# Patient Record
Sex: MALE | Race: WHITE
[De-identification: names, ages, dates, MRNs, and addresses within clinical notes are randomized per-mention and may not be internally consistent; named-entity substitution may affect disease eponyms.]

---

## 2018-07-19 ENCOUNTER — HOSPITAL ENCOUNTER (EMERGENCY)
Dept: HOSPITAL 19 - COL.ER | Age: 63
Discharge: HOME | End: 2018-07-19
Payer: SELF-PAY

## 2018-07-19 VITALS — WEIGHT: 213.41 LBS | HEIGHT: 69.02 IN | BODY MASS INDEX: 31.61 KG/M2

## 2018-07-19 VITALS — DIASTOLIC BLOOD PRESSURE: 73 MMHG | SYSTOLIC BLOOD PRESSURE: 134 MMHG | TEMPERATURE: 98 F | HEART RATE: 84 BPM

## 2018-07-19 DIAGNOSIS — E11.9: ICD-10-CM

## 2018-07-19 DIAGNOSIS — S50.12XA: Primary | ICD-10-CM
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
## 2018-07-19 DIAGNOSIS — Z79.4: ICD-10-CM

## 2018-07-19 DIAGNOSIS — E78.5: ICD-10-CM

## 2018-09-17 ENCOUNTER — HOSPITAL ENCOUNTER (OUTPATIENT)
Dept: HOSPITAL 19 - COL.RAD | Age: 63
End: 2018-09-17
Attending: PHYSICIAN ASSISTANT
Payer: COMMERCIAL

## 2018-09-17 DIAGNOSIS — M19.012: ICD-10-CM

## 2018-09-17 DIAGNOSIS — S46.012A: Primary | ICD-10-CM

## 2018-09-18 ENCOUNTER — HOSPITAL ENCOUNTER (OUTPATIENT)
Dept: HOSPITAL 19 - WSOH | Age: 63
LOS: 71 days | Discharge: HOME | End: 2018-11-28
Attending: INTERNAL MEDICINE
Payer: COMMERCIAL
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
## 2018-09-18 DIAGNOSIS — S50.12XD: Primary | ICD-10-CM

## 2018-09-18 DIAGNOSIS — Z79.84: ICD-10-CM

## 2018-09-18 DIAGNOSIS — Z79.899: ICD-10-CM

## 2018-09-18 DIAGNOSIS — M25.512: ICD-10-CM
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
## 2019-04-17 ENCOUNTER — HOSPITAL ENCOUNTER (EMERGENCY)
Dept: HOSPITAL 19 - COL.ER | Age: 64
Discharge: HOME | End: 2019-04-17
Payer: COMMERCIAL

## 2019-04-17 VITALS — BODY MASS INDEX: 31.48 KG/M2 | WEIGHT: 212.53 LBS | HEIGHT: 69.02 IN

## 2019-04-17 VITALS — HEART RATE: 90 BPM | SYSTOLIC BLOOD PRESSURE: 105 MMHG | DIASTOLIC BLOOD PRESSURE: 83 MMHG

## 2019-04-17 VITALS — TEMPERATURE: 99.4 F

## 2019-04-17 DIAGNOSIS — K56.41: Primary | ICD-10-CM

## 2019-04-17 DIAGNOSIS — Z79.4: ICD-10-CM

## 2019-04-17 DIAGNOSIS — R33.9: ICD-10-CM

## 2019-12-27 ENCOUNTER — HOSPITAL ENCOUNTER (OUTPATIENT)
Dept: HOSPITAL 19 - COL.RAD | Age: 64
End: 2019-12-27
Attending: PHYSICIAN ASSISTANT
Payer: COMMERCIAL

## 2019-12-27 DIAGNOSIS — S46.811A: ICD-10-CM

## 2019-12-27 DIAGNOSIS — Z98.890: ICD-10-CM

## 2019-12-27 DIAGNOSIS — M75.121: Primary | ICD-10-CM

## 2019-12-27 DIAGNOSIS — M19.011: ICD-10-CM

## 2019-12-27 DIAGNOSIS — S46.111A: ICD-10-CM

## 2020-10-06 ENCOUNTER — HOSPITAL ENCOUNTER (OUTPATIENT)
Dept: HOSPITAL 19 - ZCOL.LAB | Age: 65
End: 2020-10-06
Attending: FAMILY MEDICINE
Payer: COMMERCIAL

## 2020-10-06 DIAGNOSIS — Z20.828: Primary | ICD-10-CM

## 2021-03-23 ENCOUNTER — HOSPITAL ENCOUNTER (OUTPATIENT)
Dept: HOSPITAL 19 - COL.RAD | Age: 66
End: 2021-03-23
Attending: ORTHOPAEDIC SURGERY
Payer: COMMERCIAL

## 2021-03-23 DIAGNOSIS — Z98.890: ICD-10-CM

## 2021-03-23 DIAGNOSIS — M75.121: Primary | ICD-10-CM

## 2021-03-23 PROCEDURE — A9585 GADOBUTROL INJECTION: HCPCS

## 2021-08-09 ENCOUNTER — HOSPITAL ENCOUNTER (OUTPATIENT)
Dept: HOSPITAL 19 - WSOH | Age: 66
LOS: 76 days | Discharge: HOME | End: 2021-10-24
Attending: PHYSICIAN ASSISTANT
Payer: COMMERCIAL

## 2021-08-09 DIAGNOSIS — Z90.49: ICD-10-CM
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
## 2021-08-09 DIAGNOSIS — Z98.890: ICD-10-CM

## 2021-08-09 DIAGNOSIS — Z79.4: ICD-10-CM

## 2021-08-09 DIAGNOSIS — S60.222A: Primary | ICD-10-CM

## 2021-08-09 DIAGNOSIS — E78.00: ICD-10-CM

## 2021-08-09 DIAGNOSIS — M51.36: ICD-10-CM

## 2021-08-09 DIAGNOSIS — E11.40: ICD-10-CM

## 2021-08-09 PROCEDURE — A6549 G COMPRESSION STOCKING: HCPCS

## 2021-08-09 PROCEDURE — 24091: CPT

## 2021-12-20 ENCOUNTER — HOSPITAL ENCOUNTER (OUTPATIENT)
Dept: HOSPITAL 19 - WSPT | Age: 66
LOS: 11 days | Discharge: HOME | End: 2021-12-31
Attending: PHYSICIAN ASSISTANT
Payer: COMMERCIAL

## 2021-12-20 DIAGNOSIS — Z98.890: ICD-10-CM

## 2021-12-20 DIAGNOSIS — E78.00: ICD-10-CM
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
## 2021-12-20 DIAGNOSIS — M51.36: ICD-10-CM

## 2021-12-20 DIAGNOSIS — E11.40: ICD-10-CM

## 2021-12-20 DIAGNOSIS — S60.222D: Primary | ICD-10-CM

## 2021-12-20 DIAGNOSIS — Z90.49: ICD-10-CM

## 2022-01-03 ENCOUNTER — HOSPITAL ENCOUNTER (OUTPATIENT)
Dept: HOSPITAL 19 - WSPT | Age: 67
LOS: 28 days | End: 2022-01-31
Attending: PHYSICIAN ASSISTANT
Payer: COMMERCIAL

## 2022-01-03 DIAGNOSIS — S60.222D: Primary | ICD-10-CM

## 2022-01-03 DIAGNOSIS — M51.36: ICD-10-CM

## 2022-02-05 ENCOUNTER — HOSPITAL ENCOUNTER (INPATIENT)
Dept: HOSPITAL 19 - COL.ER | Age: 67
LOS: 3 days | Discharge: HOME | DRG: 439 | End: 2022-02-08
Attending: INTERNAL MEDICINE | Admitting: INTERNAL MEDICINE
Payer: COMMERCIAL

## 2022-02-05 VITALS — HEIGHT: 69.02 IN | WEIGHT: 218.26 LBS | BODY MASS INDEX: 32.33 KG/M2

## 2022-02-05 DIAGNOSIS — E11.9: ICD-10-CM

## 2022-02-05 DIAGNOSIS — Z79.4: ICD-10-CM

## 2022-02-05 DIAGNOSIS — K76.0: ICD-10-CM

## 2022-02-05 DIAGNOSIS — K85.90: Primary | ICD-10-CM

## 2022-02-05 DIAGNOSIS — Z23: ICD-10-CM

## 2022-02-05 DIAGNOSIS — E78.1: ICD-10-CM

## 2022-02-05 DIAGNOSIS — K86.3: ICD-10-CM

## 2022-02-05 DIAGNOSIS — I10: ICD-10-CM

## 2022-02-05 DIAGNOSIS — E78.5: ICD-10-CM

## 2022-02-05 DIAGNOSIS — N40.0: ICD-10-CM

## 2022-02-05 DIAGNOSIS — D72.829: ICD-10-CM

## 2022-02-05 DIAGNOSIS — Z90.49: ICD-10-CM

## 2022-02-05 LAB
ALBUMIN SERPL-MCNC: 4 GM/DL (ref 3.4–4.8)
ALP SERPL-CCNC: 52 U/L (ref 40–150)
ALT SERPL-CCNC: 32 U/L (ref 0–55)
ANION GAP SERPL CALC-SCNC: 12 MMOL/L (ref 7–16)
AST SERPL-CCNC: 24 U/L (ref 5–34)
BASOPHILS # BLD: 0.1 K/MM3 (ref 0–0.2)
BASOPHILS NFR BLD AUTO: 0.7 % (ref 0–2)
BILIRUB SERPL-MCNC: 0.4 MG/DL (ref 0.2–1.2)
BUN SERPL-MCNC: 27 MG/DL (ref 8–26)
CALCIUM SERPL-MCNC: 9.8 MG/DL (ref 8.4–10.2)
CHLORIDE SERPL-SCNC: 103 MMOL/L (ref 98–107)
CO2 SERPL-SCNC: 22 MMOL/L (ref 23–31)
CREAT SERPL-SCNC: 1.11 MG/DL (ref 0.72–1.25)
CRP SERPL-MCNC: 0.22 MG/DL (ref 0–0.5)
EOSINOPHIL # BLD: 0.1 K/MM3 (ref 0–0.7)
EOSINOPHIL NFR BLD: 0.7 % (ref 0–4)
ERYTHROCYTE [DISTWIDTH] IN BLOOD BY AUTOMATED COUNT: 13 % (ref 11.5–14.5)
GLUCOSE SERPL-MCNC: 162 MG/DL (ref 70–99)
GLUCOSE UR QL STRIP.AUTO: (no result)
GRANULOCYTES # BLD AUTO: 74.7 % (ref 42.2–75.2)
HCT VFR BLD AUTO: 43.3 % (ref 42–52)
HGB BLD-MCNC: 14.6 G/DL (ref 13.5–18)
LYMPHOCYTES # BLD: 1.8 K/MM3 (ref 1.2–3.4)
LYMPHOCYTES NFR BLD: 15.2 % (ref 20–51)
MCH RBC QN AUTO: 29 PG (ref 27–31)
MCHC RBC AUTO-ENTMCNC: 34 G/DL (ref 33–37)
MCV RBC AUTO: 86 FL (ref 80–100)
MONOCYTES # BLD: 0.9 K/MM3 (ref 0.1–0.6)
MONOCYTES NFR BLD AUTO: 7.8 % (ref 1.7–9.3)
NEUTROPHILS # BLD: 8.9 K/MM3 (ref 1.4–6.5)
PH UR STRIP.AUTO: 5 [PH] (ref 5–8)
PLATELET # BLD AUTO: 216 K/MM3 (ref 130–400)
PMV BLD AUTO: 11.2 FL (ref 7.4–10.4)
POTASSIUM SERPL-SCNC: 4.1 MMOL/L (ref 3.5–4.5)
PROT SERPL-MCNC: 7.2 GM/DL (ref 6.2–8.1)
RBC # BLD AUTO: 5.06 M/MM3 (ref 4.2–5.6)
SODIUM SERPL-SCNC: 137 MMOL/L (ref 136–145)
SP GR UR STRIP.AUTO: 1.04 (ref 1–1.03)
URN COLLECT METHOD CLASS: (no result)

## 2022-02-06 VITALS — SYSTOLIC BLOOD PRESSURE: 145 MMHG | TEMPERATURE: 97.4 F | HEART RATE: 64 BPM | DIASTOLIC BLOOD PRESSURE: 75 MMHG

## 2022-02-06 VITALS — TEMPERATURE: 97.8 F | SYSTOLIC BLOOD PRESSURE: 140 MMHG | DIASTOLIC BLOOD PRESSURE: 82 MMHG | HEART RATE: 63 BPM

## 2022-02-06 VITALS — SYSTOLIC BLOOD PRESSURE: 142 MMHG | HEART RATE: 57 BPM | DIASTOLIC BLOOD PRESSURE: 72 MMHG | TEMPERATURE: 97.3 F

## 2022-02-06 VITALS — HEART RATE: 69 BPM | TEMPERATURE: 99.5 F | SYSTOLIC BLOOD PRESSURE: 128 MMHG | DIASTOLIC BLOOD PRESSURE: 63 MMHG

## 2022-02-06 VITALS — SYSTOLIC BLOOD PRESSURE: 150 MMHG | HEART RATE: 57 BPM | DIASTOLIC BLOOD PRESSURE: 78 MMHG | TEMPERATURE: 97.5 F

## 2022-02-06 VITALS — SYSTOLIC BLOOD PRESSURE: 146 MMHG | DIASTOLIC BLOOD PRESSURE: 75 MMHG | HEART RATE: 70 BPM | TEMPERATURE: 98.6 F

## 2022-02-06 VITALS — TEMPERATURE: 98.7 F | SYSTOLIC BLOOD PRESSURE: 128 MMHG | DIASTOLIC BLOOD PRESSURE: 66 MMHG | HEART RATE: 77 BPM

## 2022-02-06 LAB
ANION GAP SERPL CALC-SCNC: 8 MMOL/L (ref 7–16)
BASOPHILS # BLD: 0.1 K/MM3 (ref 0–0.2)
BASOPHILS NFR BLD AUTO: 0.5 % (ref 0–2)
BUN SERPL-MCNC: 23 MG/DL (ref 8–26)
CALCIUM SERPL-MCNC: 8.9 MG/DL (ref 8.4–10.2)
CHLORIDE SERPL-SCNC: 105 MMOL/L (ref 98–107)
CHOLEST SPEC-SCNC: 176 MG/DL (ref 0–199)
CHOLEST/HDLC SERPL-SRTO: 5.8
CO2 SERPL-SCNC: 25 MMOL/L (ref 23–31)
CREAT SERPL-SCNC: 1.05 MG/DL (ref 0.72–1.25)
EOSINOPHIL # BLD: 0.1 K/MM3 (ref 0–0.7)
EOSINOPHIL NFR BLD: 0.7 % (ref 0–4)
ERYTHROCYTE [DISTWIDTH] IN BLOOD BY AUTOMATED COUNT: 13 % (ref 11.5–14.5)
GLUCOSE SERPL-MCNC: 119 MG/DL (ref 70–99)
GRANULOCYTES # BLD AUTO: 72 % (ref 42.2–75.2)
HCT VFR BLD AUTO: 40.8 % (ref 42–52)
HDLC SERPL-MCNC: 30 MG/DL (ref 40–60)
HGB BLD-MCNC: 13.6 G/DL (ref 13.5–18)
LDLC SERPL-MCNC: 72 MG/DL
LYMPHOCYTES # BLD: 1.8 K/MM3 (ref 1.2–3.4)
LYMPHOCYTES NFR BLD: 16.1 % (ref 20–51)
MAGNESIUM SERPL-MCNC: 2 MG/DL (ref 1.6–2.6)
MCH RBC QN AUTO: 29 PG (ref 27–31)
MCHC RBC AUTO-ENTMCNC: 33 G/DL (ref 33–37)
MCV RBC AUTO: 86 FL (ref 80–100)
MONOCYTES # BLD: 1.1 K/MM3 (ref 0.1–0.6)
MONOCYTES NFR BLD AUTO: 9.9 % (ref 1.7–9.3)
NEUTROPHILS # BLD: 7.8 K/MM3 (ref 1.4–6.5)
PLATELET # BLD AUTO: 182 K/MM3 (ref 130–400)
PMV BLD AUTO: 11.9 FL (ref 7.4–10.4)
POTASSIUM SERPL-SCNC: 4.3 MMOL/L (ref 3.5–4.5)
RBC # BLD AUTO: 4.72 M/MM3 (ref 4.2–5.6)
SODIUM SERPL-SCNC: 138 MMOL/L (ref 136–145)
TRIGL SERPL-MCNC: 368 MG/DL (ref 0–149)

## 2022-02-06 NOTE — NUR
Lyssa met with the pt who stated his preference to return home once medically
stable. The pt lives at home with his wife. Esthela, [phone]. The pt
reports no issues with ADLS and does use a glucometer. The pt pcp is Zach Pinto and gets his medications from [facility]. The pt reports no
DPOA-HC and is not interested at this time.  No other needs stated,
sw to await further recommendations and follow up as needed.
DC Plan: Home w/wife

## 2022-02-06 NOTE — NUR
Has been resting quietly- Did get Morphine IV x2 this shift for abd pain 7/10.
Iv fluids of NS at 250cc/hr. Voiding.

## 2022-02-06 NOTE — NUR
PT PLEASANT, AOX4, PT REPORTS PAIN 8/10 SAYING "IT FEELS EVEN WORSE THAN WHEN
I CAME IN" , ASSESSMENT PERFORMED, MEDICATIONS GIVEN, PAIN MEDICATION GIVEN
WITH TUMS PER PT REQUEST, PT WANTING TO DRINK LOTS OF FLUIDS AND EAT,
ENCOURAGED SLOWING DOWN ON FLUIDS TO REST BOWELS. PT WANTING SOMETHING FOR
CONSTIPATION, REPORTS LAST BM YESTERDAY MORNING. STATES "I DON'T WANT THIS
STUFF TO BACK ME UP, ITS HAPPENED BEFORE".

## 2022-02-06 NOTE — NUR
Admitted to medical floor from ER,, DX pancreatitis, VSS, pleasant, oriented
x4, Sitting in recliner at this time, does not want a gown, would prefer to
stay in shorts and a t-shirt. Has glasses, bilteral hearing aides, cell phone,
pager at bedside. States abd pain 4/10-was given Morphne as ordered. On C.L
diet. Iv fluids of NS at 250cc/hr

## 2022-02-07 VITALS — DIASTOLIC BLOOD PRESSURE: 58 MMHG | HEART RATE: 66 BPM | SYSTOLIC BLOOD PRESSURE: 116 MMHG | TEMPERATURE: 99.9 F

## 2022-02-07 VITALS — DIASTOLIC BLOOD PRESSURE: 64 MMHG | HEART RATE: 73 BPM | TEMPERATURE: 97.7 F | SYSTOLIC BLOOD PRESSURE: 135 MMHG

## 2022-02-07 VITALS — DIASTOLIC BLOOD PRESSURE: 58 MMHG | HEART RATE: 65 BPM | SYSTOLIC BLOOD PRESSURE: 113 MMHG | TEMPERATURE: 98 F

## 2022-02-07 VITALS — TEMPERATURE: 98.8 F | DIASTOLIC BLOOD PRESSURE: 60 MMHG | HEART RATE: 62 BPM | SYSTOLIC BLOOD PRESSURE: 113 MMHG

## 2022-02-07 VITALS — SYSTOLIC BLOOD PRESSURE: 104 MMHG | DIASTOLIC BLOOD PRESSURE: 53 MMHG | HEART RATE: 68 BPM | TEMPERATURE: 98.6 F

## 2022-02-07 VITALS — TEMPERATURE: 98.1 F | HEART RATE: 66 BPM | DIASTOLIC BLOOD PRESSURE: 74 MMHG | SYSTOLIC BLOOD PRESSURE: 130 MMHG

## 2022-02-07 LAB
ALBUMIN SERPL-MCNC: 3.2 GM/DL (ref 3.4–4.8)
ANION GAP SERPL CALC-SCNC: 12 MMOL/L (ref 7–16)
BASOPHILS # BLD: 0 K/MM3 (ref 0–0.2)
BASOPHILS NFR BLD AUTO: 0.4 % (ref 0–2)
BUN SERPL-MCNC: 12 MG/DL (ref 8–26)
CALCIUM SERPL-MCNC: 8.5 MG/DL (ref 8.4–10.2)
CHLORIDE SERPL-SCNC: 105 MMOL/L (ref 98–107)
CO2 SERPL-SCNC: 20 MMOL/L (ref 23–31)
CREAT SERPL-SCNC: 0.77 MG/DL (ref 0.72–1.25)
EOSINOPHIL # BLD: 0.2 K/MM3 (ref 0–0.7)
EOSINOPHIL NFR BLD: 1.7 % (ref 0–4)
ERYTHROCYTE [DISTWIDTH] IN BLOOD BY AUTOMATED COUNT: 13 % (ref 11.5–14.5)
GLUCOSE SERPL-MCNC: 92 MG/DL (ref 70–99)
GRANULOCYTES # BLD AUTO: 67.8 % (ref 42.2–75.2)
HCT VFR BLD AUTO: 40.9 % (ref 42–52)
HGB BLD-MCNC: 13.7 G/DL (ref 13.5–18)
LYMPHOCYTES # BLD: 1.5 K/MM3 (ref 1.2–3.4)
LYMPHOCYTES NFR BLD: 15.4 % (ref 20–51)
MAGNESIUM SERPL-MCNC: 1.9 MG/DL (ref 1.6–2.6)
MCH RBC QN AUTO: 29 PG (ref 27–31)
MCHC RBC AUTO-ENTMCNC: 34 G/DL (ref 33–37)
MCV RBC AUTO: 86 FL (ref 80–100)
MONOCYTES # BLD: 1.4 K/MM3 (ref 0.1–0.6)
MONOCYTES NFR BLD AUTO: 14.1 % (ref 1.7–9.3)
NEUTROPHILS # BLD: 6.6 K/MM3 (ref 1.4–6.5)
PHOSPHATE SERPL-MCNC: 2.6 MG/DL (ref 2.3–4.7)
PLATELET # BLD AUTO: 168 K/MM3 (ref 130–400)
PMV BLD AUTO: 11.4 FL (ref 7.4–10.4)
POTASSIUM SERPL-SCNC: 3.8 MMOL/L (ref 3.5–4.5)
RBC # BLD AUTO: 4.78 M/MM3 (ref 4.2–5.6)
SODIUM SERPL-SCNC: 137 MMOL/L (ref 136–145)

## 2022-02-07 NOTE — NUR
Patient is in the chair, alert and oriented x 4, VSS, states constant pain in
his left upper abd. PRN provided. Receiving NS 50 ml/hr. Assessment completed,
medications provided. No further needs at this time. Call light within reach.

## 2022-02-07 NOTE — NUR
ASSESSMENT COMPLETE FOR THIS SHIFT. PT RESTING IN HIS RECLINER WITH HIS WIFE
BY HIS SIDE. PT COMPLAINED OF SOME ABD PAIN, BUT WANTED TO WAIT UNTIL AFTER
2030HRS BEFORE GETTING PAIN MEDICATION. PT GIVEN ULTRAM FOR PAIN. PAIN
MEDICATION EFFECTIVE PER PT. PT DENIED PALPITATIONS, SOB, N,V,D OR DIZZINESS.
PT EXPRESSED NO OTHER NEEDS AT THIS TIME. CALL LIGHT WITHIN REACH.

## 2022-02-07 NOTE — NUR
Pt assessment complete. Pt is laying in bed upon entry, he is A/O x4. His
breathing is even and unlabored on RA. Pt denies SOB. Some nausea and pain
present. PRN medications administered. Pt reports tenderness to abdomen with
palpation. POC discussed with patient, he will be NPO until U/S of abdomen is
complete followed by a fat controlled diet. No further needs at this time.
Call light within reach.

## 2022-02-07 NOTE — NUR
Pt had uneventful day. Intermittent pain to abdomen and L side. No further
nausea through the day. Does have complaints of constipation. Tolerating low
fat diet without issues.

## 2022-02-08 VITALS — HEART RATE: 68 BPM | SYSTOLIC BLOOD PRESSURE: 125 MMHG | DIASTOLIC BLOOD PRESSURE: 58 MMHG | TEMPERATURE: 98.1 F

## 2022-02-08 VITALS — HEART RATE: 95 BPM | TEMPERATURE: 98.2 F | DIASTOLIC BLOOD PRESSURE: 72 MMHG | SYSTOLIC BLOOD PRESSURE: 112 MMHG

## 2022-02-08 VITALS — TEMPERATURE: 98.5 F | HEART RATE: 62 BPM | SYSTOLIC BLOOD PRESSURE: 116 MMHG | DIASTOLIC BLOOD PRESSURE: 72 MMHG

## 2022-02-08 LAB
ALBUMIN SERPL-MCNC: 3 GM/DL (ref 3.4–4.8)
ANION GAP SERPL CALC-SCNC: 9 MMOL/L (ref 7–16)
BASOPHILS # BLD: 0.1 K/MM3 (ref 0–0.2)
BASOPHILS NFR BLD AUTO: 0.7 % (ref 0–2)
BUN SERPL-MCNC: 15 MG/DL (ref 8–26)
CALCIUM SERPL-MCNC: 8.6 MG/DL (ref 8.4–10.2)
CHLORIDE SERPL-SCNC: 105 MMOL/L (ref 98–107)
CO2 SERPL-SCNC: 24 MMOL/L (ref 23–31)
CREAT SERPL-SCNC: 0.83 MG/DL (ref 0.72–1.25)
EOSINOPHIL # BLD: 0.2 K/MM3 (ref 0–0.7)
EOSINOPHIL NFR BLD: 3.2 % (ref 0–4)
ERYTHROCYTE [DISTWIDTH] IN BLOOD BY AUTOMATED COUNT: 12.9 % (ref 11.5–14.5)
GLUCOSE SERPL-MCNC: 143 MG/DL (ref 70–99)
GRANULOCYTES # BLD AUTO: 55.1 % (ref 42.2–75.2)
HCT VFR BLD AUTO: 38.1 % (ref 42–52)
HGB BLD-MCNC: 12.8 G/DL (ref 13.5–18)
LYMPHOCYTES # BLD: 1.8 K/MM3 (ref 1.2–3.4)
LYMPHOCYTES NFR BLD: 23.5 % (ref 20–51)
MAGNESIUM SERPL-MCNC: 2.1 MG/DL (ref 1.6–2.6)
MCH RBC QN AUTO: 29 PG (ref 27–31)
MCHC RBC AUTO-ENTMCNC: 34 G/DL (ref 33–37)
MCV RBC AUTO: 88 FL (ref 80–100)
MONOCYTES # BLD: 1.3 K/MM3 (ref 0.1–0.6)
MONOCYTES NFR BLD AUTO: 16.6 % (ref 1.7–9.3)
NEUTROPHILS # BLD: 4.2 K/MM3 (ref 1.4–6.5)
PHOSPHATE SERPL-MCNC: 2.6 MG/DL (ref 2.3–4.7)
PLATELET # BLD AUTO: 152 K/MM3 (ref 130–400)
PMV BLD AUTO: 11.5 FL (ref 7.4–10.4)
POTASSIUM SERPL-SCNC: 3.9 MMOL/L (ref 3.5–4.5)
RBC # BLD AUTO: 4.35 M/MM3 (ref 4.2–5.6)
SODIUM SERPL-SCNC: 138 MMOL/L (ref 136–145)

## 2022-02-08 NOTE — NUR
Patient had a calm night. He asked for pain medication before going to sleep
and no more after that. Continue getting IV fluids at 50ml/hr. Report given to
day RN.

## 2022-02-08 NOTE — NUR
Patient sitting up in bed A&Ox4. Reports pain in left side. No pain medication
requested. VSS. IV CDI, fluids infusing. Reports to BM. Independent in the
room. No further needs expressed. Call light within reach

## 2022-02-08 NOTE — NUR
Patient ambulated independently to the ER entrance with family. Discharge
paperwork and personal belongings with the patient. No further needs
expressed.

## 2023-03-20 ENCOUNTER — HOSPITAL ENCOUNTER (OUTPATIENT)
Dept: HOSPITAL 19 - WSPT | Age: 68
LOS: 11 days | Discharge: HOME | End: 2023-03-31
Attending: FAMILY MEDICINE
Payer: COMMERCIAL

## 2023-03-20 DIAGNOSIS — M54.41: Primary | ICD-10-CM

## 2023-03-20 DIAGNOSIS — N52.8: ICD-10-CM

## 2023-03-20 DIAGNOSIS — Z23: ICD-10-CM

## 2023-03-20 DIAGNOSIS — E11.42: ICD-10-CM

## 2023-03-20 DIAGNOSIS — L57.0: ICD-10-CM

## 2023-03-20 DIAGNOSIS — J31.0: ICD-10-CM

## 2023-03-20 DIAGNOSIS — E78.5: ICD-10-CM

## 2024-08-28 ENCOUNTER — HOSPITAL ENCOUNTER (OUTPATIENT)
Dept: HOSPITAL 19 - WSPT | Age: 69
LOS: 3 days | End: 2024-08-31
Payer: COMMERCIAL

## 2024-08-28 DIAGNOSIS — M51.16: ICD-10-CM

## 2024-08-28 DIAGNOSIS — Z98.890: ICD-10-CM

## 2024-08-28 DIAGNOSIS — M48.061: ICD-10-CM

## 2024-08-28 DIAGNOSIS — M96.1: Primary | ICD-10-CM
